# Patient Record
Sex: MALE | Race: OTHER
[De-identification: names, ages, dates, MRNs, and addresses within clinical notes are randomized per-mention and may not be internally consistent; named-entity substitution may affect disease eponyms.]

---

## 2020-12-09 PROBLEM — Z00.00 ENCOUNTER FOR PREVENTIVE HEALTH EXAMINATION: Status: ACTIVE | Noted: 2020-12-09

## 2020-12-21 ENCOUNTER — APPOINTMENT (OUTPATIENT)
Dept: COLORECTAL SURGERY | Facility: CLINIC | Age: 35
End: 2020-12-21
Payer: COMMERCIAL

## 2020-12-21 VITALS
BODY MASS INDEX: 23.94 KG/M2 | SYSTOLIC BLOOD PRESSURE: 134 MMHG | HEIGHT: 71 IN | TEMPERATURE: 97.3 F | DIASTOLIC BLOOD PRESSURE: 75 MMHG | HEART RATE: 80 BPM | WEIGHT: 171 LBS

## 2020-12-21 PROCEDURE — 46221 LIGATION OF HEMORRHOID(S): CPT

## 2020-12-21 PROCEDURE — 99072 ADDL SUPL MATRL&STAF TM PHE: CPT

## 2020-12-21 PROCEDURE — 99202 OFFICE O/P NEW SF 15 MIN: CPT | Mod: 25

## 2020-12-21 NOTE — PHYSICAL EXAM
[Excoriation] : no perianal excoriation [Normal] : was normal [None] : there was no rectal mass  [FreeTextEntry1] : A lighted anoscope was passed into the anal canal and the entire anal mucosal surface was inspected..  The findings revealed moderate internal hemorrhoids. No masses or lesions were identified.\par \par The risks and benefits of rubber band ligation were discussed with the patient including but not limited to bleeding, pain, infection, and the need for future procedures. The anoscope was placed and rubber band ligation was performed of the internal hemorrhoids -RAQ and RPQ  with good result. The patient tolerated the procedure well. Appropriate postprocedure instructions were given to the patient.\par

## 2020-12-21 NOTE — HISTORY OF PRESENT ILLNESS
[FreeTextEntry1] : 34 y/o M presents for evaluation of hemorrhoids, referred by Dr. Gavi Vance\par Reports h/o internal and external hemorrhoids for ~ 9 years. H/o RBL 5+ years ago with minimal improvement in symptoms \par C/o intermittent anal itching, pain, and BRBPR on the TP \par BH: 1-2 times daily\par Denies constipation or straining currently but admits to constipation in the past.H/o lactose intolerance and easily upset stomach that occasionally leads to diarrhea \par Reports adequate dietary fiber and water intake. Drinking ~ 6 cups of water daily\par Taking Magnesium 1-2 tabs daily for regularity \par Has tried several OTC hemorrhoid medications and prescription medications with temporary relief of symptoms\par Never had a colonoscopy \par [facility] of colon cancer in maternal grandfather (over age 50). Also reports colon polyps in mother and grandmother  \par No ASA/NSAIDs last 7 days

## 2021-02-04 ENCOUNTER — APPOINTMENT (OUTPATIENT)
Dept: COLORECTAL SURGERY | Facility: CLINIC | Age: 36
End: 2021-02-04
Payer: COMMERCIAL

## 2021-02-04 VITALS
TEMPERATURE: 97.7 F | HEIGHT: 71 IN | WEIGHT: 169 LBS | SYSTOLIC BLOOD PRESSURE: 131 MMHG | DIASTOLIC BLOOD PRESSURE: 79 MMHG | BODY MASS INDEX: 23.66 KG/M2 | HEART RATE: 90 BPM

## 2021-02-04 DIAGNOSIS — K64.8 OTHER HEMORRHOIDS: ICD-10-CM

## 2021-02-04 PROCEDURE — 99213 OFFICE O/P EST LOW 20 MIN: CPT | Mod: 25

## 2021-02-04 PROCEDURE — 46221 LIGATION OF HEMORRHOID(S): CPT

## 2021-02-04 PROCEDURE — 99072 ADDL SUPL MATRL&STAF TM PHE: CPT

## 2021-02-04 RX ORDER — MAGNESIUM OXIDE/MAG AA CHELATE 300 MG
CAPSULE ORAL
Refills: 0 | Status: DISCONTINUED | COMMUNITY
End: 2021-02-04

## 2021-02-04 NOTE — HISTORY OF PRESENT ILLNESS
[FreeTextEntry1] : 36 yo M presents for f/u hemorrhoids\par hx of RBL approx 5 years ago w/ minimal improvement in symptoms\par Seen 12/21/20 c/o intermittent itching, pain and BRBPR\par \par Exam notable for moderate internal hemorrhoids, s/p RBL to RAQ and RPQ. Patient recovered well, had some discomfort but no bleeding after the procedure.\par \par Continues to have sensation of pressure and fullness in his rectum, similar to the symptoms he had prior to the banding procedure at last visit. Reports minimal improvement, but continues to have the pressure and discomfort. Occasional bleeding on toilet paper, no blood on stool or in the bowl. Minimal itching, no external discomfort.\par \par BH: Usually twice per day, softer stools, no constipation, no straining, no diarrhea.\par Does not optimize dietary fiber but has overall balanced diet, and reports good water intake\par Never had a colonoscopy\par (+) MGF w/ CRC dx age over 50; colon polyps in mother and grandmother

## 2021-02-04 NOTE — PHYSICAL EXAM
[Excoriation] : no perianal excoriation [Normal] : was normal [None] : there was no rectal mass  [FreeTextEntry1] : A lighted anoscope was passed into the anal canal and the entire anal mucosal surface was inspected..  The findings revealed moderate internal hemorrhoids. No masses or lesions were identified.\par \par The risks and benefits of rubber band ligation were discussed with the patient including but not limited to bleeding, pain, infection, and the need for future procedures. The anoscope was placed and rubber band ligation was performed of the internal hemorrhoids - left lateral and RPQ with good result. The patient tolerated the procedure well. Appropriate postprocedure instructions were given to the patient.\par

## 2021-02-05 RX ORDER — OXYCODONE AND ACETAMINOPHEN 5; 325 MG/1; MG/1
5-325 TABLET ORAL EVERY 6 HOURS
Qty: 8 | Refills: 0 | Status: ACTIVE | COMMUNITY
Start: 2021-02-05 | End: 1900-01-01

## 2021-03-26 ENCOUNTER — APPOINTMENT (OUTPATIENT)
Dept: COLORECTAL SURGERY | Facility: CLINIC | Age: 36
End: 2021-03-26

## 2024-09-30 ENCOUNTER — NON-APPOINTMENT (OUTPATIENT)
Age: 39
End: 2024-09-30

## 2024-09-30 ENCOUNTER — APPOINTMENT (OUTPATIENT)
Dept: COLORECTAL SURGERY | Facility: CLINIC | Age: 39
End: 2024-09-30
Payer: COMMERCIAL

## 2024-09-30 VITALS
HEIGHT: 71 IN | SYSTOLIC BLOOD PRESSURE: 123 MMHG | HEART RATE: 72 BPM | TEMPERATURE: 97.4 F | WEIGHT: 164 LBS | BODY MASS INDEX: 22.96 KG/M2 | DIASTOLIC BLOOD PRESSURE: 78 MMHG

## 2024-09-30 DIAGNOSIS — Z80.0 ENCOUNTER FOR SCREENING FOR MALIGNANT NEOPLASM OF COLON: ICD-10-CM

## 2024-09-30 DIAGNOSIS — K60.2 ANAL FISSURE, UNSPECIFIED: ICD-10-CM

## 2024-09-30 DIAGNOSIS — K62.5 HEMORRHAGE OF ANUS AND RECTUM: ICD-10-CM

## 2024-09-30 DIAGNOSIS — Z12.11 ENCOUNTER FOR SCREENING FOR MALIGNANT NEOPLASM OF COLON: ICD-10-CM

## 2024-09-30 PROCEDURE — 99203 OFFICE O/P NEW LOW 30 MIN: CPT

## 2024-09-30 NOTE — PHYSICAL EXAM
[Posterior] : posteriorly [Excoriation] : no perianal excoriation [Tight] : was tight [None] : there was no rectal mass

## 2024-09-30 NOTE — ASSESSMENT
[FreeTextEntry1] : Rectal bleeding.  Change in bowel habits.  Anal fissure.  Family history of colon cancer  I had an extensive discussion with the patient (30 minutes) regarding the diagnosis of an anal fissure. The etiology is suspected to be related to a hypertonic sphincter as well as secondary direct anal trauma. The medical and surgical management options have been reviewed in detail including lubricant suppository therapy, stool softeners, fiber agents, and surgical anal dilatation. The risks, benefits and alternatives including bleeding infection, nonhealing wounds, and permanent leakage, seepage and incontinence have been detailed. All questions have been reviewed and answered. Appropriate literature has been shared with the patient.  I have reviewed with the patient in detail the role of colonoscopy any evaluation of possible colon polyps and cancer. The risks, alternatives and benefits of the procedure were detailed and client including but not limited to risk of bleeding, risk of infection, risk of perforation and requiring further and potential surgery, and other secondary complications of the procedure. The colonoscopy will be performed to evaluate for possible polyps and cancer and if possible a polypectomy or removal of the polyp will be performed. Bowel preparation instructions were reviewed. The need for long-term surveillance based on findings of the colonoscopy were discussed. The patient consents to the planned procedure.  Recommend colonoscopy.  Patient with maternal grandfather young age with colon cancer.  Mother with multiple polyps.

## 2024-09-30 NOTE — HISTORY OF PRESENT ILLNESS
[FreeTextEntry1] : 40 y/o M presents for follow up visit requesting for colonoscopy consult.   PMHx: denies  PSHx: lasik eye surgery  Allergies: lactose Family Hx:  Maternal grandfather (+) CRC, mother with multiple polyps  Initially seen 12/21/20 with complaints of intermittent itching, pain and BRBPR  Exam notable for moderate internal hemorrhoids, s/p RBL to RAQ and RPQ  Last seen 2/4/21 s/p RBL x 2 (left lateral and right posterior hemorrhoids)   Patient here today co rectal pain. Feels itching and burning during BM with BRPBR on TP after BM. Not currently using any topicals or sitz baths.  Patient also reporting LLQ dull pain x1 month. Comes and goes, no identifiable triggers. Bleeding occurred prior to abdominal pain starting.   Denies fevers, unintentional weight loss, prolapsed rectal tissue.   BH: thin loose stools 3+x/ day Denies stool softeners or fiber supplements

## 2025-03-19 ENCOUNTER — APPOINTMENT (OUTPATIENT)
Dept: COLORECTAL SURGERY | Facility: CLINIC | Age: 40
End: 2025-03-19